# Patient Record
Sex: MALE | URBAN - NONMETROPOLITAN AREA
[De-identification: names, ages, dates, MRNs, and addresses within clinical notes are randomized per-mention and may not be internally consistent; named-entity substitution may affect disease eponyms.]

---

## 2018-06-20 ENCOUNTER — APPOINTMENT (RX ONLY)
Dept: URBAN - NONMETROPOLITAN AREA CLINIC 22 | Facility: CLINIC | Age: 33
Setting detail: DERMATOLOGY
End: 2018-06-20

## 2018-06-20 DIAGNOSIS — L40.0 PSORIASIS VULGARIS: ICD-10-CM

## 2018-06-20 DIAGNOSIS — B07.0 PLANTAR WART: ICD-10-CM

## 2018-06-20 PROCEDURE — ? PRESCRIPTION

## 2018-06-20 PROCEDURE — 99202 OFFICE O/P NEW SF 15 MIN: CPT | Mod: 25

## 2018-06-20 PROCEDURE — ? LIQUID NITROGEN

## 2018-06-20 PROCEDURE — ? TREATMENT REGIMEN

## 2018-06-20 PROCEDURE — 17110 DESTRUCTION B9 LES UP TO 14: CPT

## 2018-06-20 PROCEDURE — ? COUNSELING

## 2018-06-20 RX ORDER — CALCIPOTRIENE 50 UG/G
0.005% CREAM TOPICAL DAILY
Qty: 1 | Refills: 11 | Status: ERX | COMMUNITY
Start: 2018-06-20

## 2018-06-20 RX ORDER — TRIAMCINOLONE ACETONIDE 5 MG/G
0.5% CREAM TOPICAL
Qty: 2 | Refills: 4 | Status: ERX | COMMUNITY
Start: 2018-06-20

## 2018-06-20 RX ADMIN — TRIAMCINOLONE ACETONIDE 0.5%: 5 CREAM TOPICAL at 16:48

## 2018-06-20 RX ADMIN — CALCIPOTRIENE 0.005%: 50 CREAM TOPICAL at 14:45

## 2018-06-20 ASSESSMENT — LOCATION DETAILED DESCRIPTION DERM
LOCATION DETAILED: RIGHT ANTERIOR SCROTUM
LOCATION DETAILED: RIGHT MEDIAL 5TH TOE

## 2018-06-20 ASSESSMENT — LOCATION SIMPLE DESCRIPTION DERM
LOCATION SIMPLE: RIGHT 5TH TOE
LOCATION SIMPLE: SCROTUM

## 2018-06-20 ASSESSMENT — LOCATION ZONE DERM
LOCATION ZONE: TOE
LOCATION ZONE: GENITALIA

## 2018-06-20 ASSESSMENT — TOTAL NUMBER OF LESIONS: # OF LESIONS?: 6

## 2018-06-20 NOTE — PROCEDURE: TREATMENT REGIMEN
Plan: Discussed diagnosis in detail with patient today. Discussed with patient that this condition is a chronic condition in which symptoms can be managed but, may never clear completely. Informed patient that symptoms of this condition can be exacerbated with heat and weather changes. Informed patient of the risks and side effects of topical steroids. Informed patient that continuous steroid use will cause thinning of the skin and could worsen his symptoms. Informed patient that steroids are best used in short intervals. All of the patient's questions were answered to his satisfaction. Informed patient to begin treatment plan as discussed today, patient voiced understanding
Action 2: Continue
Detail Level: Zone
Plan: Informed patient that warts can be treated with cryotherapy today. Informed patient of the side effects associated with treatment with cryotherapy. Patient voiced understanding and is agreeable to treatment with cryotherapy as discussed today. Informed patient that he may follow up at this convenience for further treatment of other lesions
Initiate Regimen: - calcipotriene 0.005 % topical cream : Apply to psoriasis 1-2 times daily. Avoid use on the face, groin, & armpits\\n- Triamcinolone Acetonide 0.05% - Apply to affected areas on the groin BID, 5 days on 5 days Off PRN

## 2018-06-20 NOTE — PROCEDURE: LIQUID NITROGEN
Medical Necessity Clause: This procedure was medically necessary because the lesions that were treated were:
Number Of Freeze-Thaw Cycles: 2 freeze-thaw cycles
Duration Of Freeze Thaw-Cycle (Seconds): 5-10
Detail Level: Detailed
Post-Care Instructions: I reviewed with the patient in detail post-care instructions. Patient is to wear sunprotection, and avoid picking at any of the treated lesions. Pt may apply Vaseline to crusted or scabbing areas.
Medical Necessity Information: It is in your best interest to select a reason for this procedure from the list below. All of these items fulfill various CMS LCD requirements except the new and changing color options.
Consent: The patient's consent was obtained including but not limited to risks of crusting, scabbing, blistering, scarring, darker or lighter pigmentary change, recurrence, incomplete removal and infection.
Add 52 Modifier (Optional): no
Render Post-Care Instructions In Note?: yes

## 2018-06-20 NOTE — HPI: WARTS (VERRUCA)
Is This A New Presentation, Or A Follow-Up?: Warts
How Severe Are Your Warts?: mild
Treatment Number (Optional): 1
Additional History: Patient states that his warts were treated with Cryotherapy over 1 year ago. He states that he was seen by a podiatrist 2 months ago that shaved his warts down. Patient states that he is leaving on a trip and would like to have is most bothersome lesion treated today.

## 2018-07-12 ENCOUNTER — APPOINTMENT (RX ONLY)
Dept: URBAN - NONMETROPOLITAN AREA CLINIC 22 | Facility: CLINIC | Age: 33
Setting detail: DERMATOLOGY
End: 2018-07-12

## 2018-07-12 DIAGNOSIS — L40.0 PSORIASIS VULGARIS: ICD-10-CM

## 2018-07-12 DIAGNOSIS — B07.0 PLANTAR WART: ICD-10-CM

## 2018-07-12 PROBLEM — L20.84 INTRINSIC (ALLERGIC) ECZEMA: Status: ACTIVE | Noted: 2018-07-12

## 2018-07-12 PROCEDURE — ? COUNSELING

## 2018-07-12 PROCEDURE — ? TREATMENT REGIMEN

## 2018-07-12 PROCEDURE — ? LIQUID NITROGEN

## 2018-07-12 PROCEDURE — 99213 OFFICE O/P EST LOW 20 MIN: CPT | Mod: 25

## 2018-07-12 PROCEDURE — 17110 DESTRUCTION B9 LES UP TO 14: CPT

## 2018-07-12 ASSESSMENT — LOCATION ZONE DERM
LOCATION ZONE: GENITALIA
LOCATION ZONE: TOE

## 2018-07-12 ASSESSMENT — LOCATION SIMPLE DESCRIPTION DERM
LOCATION SIMPLE: SCROTUM
LOCATION SIMPLE: RIGHT FOOT

## 2018-07-12 ASSESSMENT — LOCATION DETAILED DESCRIPTION DERM
LOCATION DETAILED: RIGHT ANTERIOR SCROTUM
LOCATION DETAILED: RIGHT 4TH TOE

## 2018-07-12 NOTE — PROCEDURE: LIQUID NITROGEN
Post-Care Instructions: I reviewed with the patient in detail post-care instructions. Patient is to wear sunprotection, and avoid picking at any of the treated lesions. Pt may apply Vaseline to crusted or scabbing areas.
Medical Necessity Information: It is in your best interest to select a reason for this procedure from the list below. All of these items fulfill various CMS LCD requirements except the new and changing color options.
Include Z78.9 (Other Specified Conditions Influencing Health Status) As An Associated Diagnosis?: Yes
Add 52 Modifier (Optional): no
Number Of Freeze-Thaw Cycles: 1 freeze-thaw cycle
Detail Level: Detailed
Medical Necessity Clause: This procedure was medically necessary because the lesions that were treated were:
Consent: The patient's consent was obtained including but not limited to risks of crusting, scabbing, blistering, scarring, darker or lighter pigmentary change, recurrence, incomplete removal and infection.
Duration Of Freeze Thaw-Cycle (Seconds): 5-10

## 2018-07-12 NOTE — PROCEDURE: TREATMENT REGIMEN
Action 3: Continue
Detail Level: Zone
Plan: Discussed with patient that this condition is a chronic condition in which symptoms can be managed but, may never clear completely. Informed patient that symptoms of this condition can be exacerbated with heat and weather changes. Informed patient of the risks and side effects of topical steroids. Informed patient that continuous steroid use will cause thinning of the skin and could worsen his symptoms. Informed patient that steroids are best used in short intervals. All of the patient's questions were answered to his satisfaction. Informed patient to continue treatment plan as discussed today, patient voiced understanding
Continue Regimen: - calcipotriene 0.005 % topical cream : Apply to psoriasis 1-2 times daily.\\n- Triamcinolone Acetonide 0.05% - Apply to affected areas Avoid use on the face, groin, & armpits  BID, 5 days on 5 days Off PRN

## 2018-07-12 NOTE — PROCEDURE: COUNSELING
Patient Specific Counseling (Will Not Stick From Patient To Patient): Patient states that he is unable to have all locations treated today and will schedule back at his convenience for removal of additional locations on his feet.
Detail Level: Simple
Detail Level: Generalized

## 2018-07-23 ENCOUNTER — APPOINTMENT (RX ONLY)
Dept: URBAN - NONMETROPOLITAN AREA CLINIC 22 | Facility: CLINIC | Age: 33
Setting detail: DERMATOLOGY
End: 2018-07-23

## 2018-07-23 DIAGNOSIS — B07.0 PLANTAR WART: ICD-10-CM

## 2018-07-23 PROCEDURE — ? BENIGN DESTRUCTION

## 2018-07-23 PROCEDURE — ? COUNSELING

## 2018-07-23 PROCEDURE — 17110 DESTRUCTION B9 LES UP TO 14: CPT

## 2018-07-23 ASSESSMENT — LOCATION ZONE DERM
LOCATION ZONE: TOE
LOCATION ZONE: FEET

## 2018-07-23 ASSESSMENT — LOCATION DETAILED DESCRIPTION DERM
LOCATION DETAILED: RIGHT PLANTAR FOREFOOT OVERLYING 4TH METATARSAL
LOCATION DETAILED: RIGHT PLANTAR FOREFOOT OVERLYING 2ND METATARSAL
LOCATION DETAILED: RIGHT LATERAL PLANTAR MIDFOOT
LOCATION DETAILED: RIGHT 4TH TOE
LOCATION DETAILED: RIGHT MEDIAL PLANTAR MIDFOOT

## 2018-07-23 ASSESSMENT — LOCATION SIMPLE DESCRIPTION DERM
LOCATION SIMPLE: RIGHT PLANTAR SURFACE
LOCATION SIMPLE: RIGHT FOOT

## 2018-07-23 NOTE — PROCEDURE: BENIGN DESTRUCTION
Anesthesia Volume In Cc: 1
Post-Care Instructions: I reviewed with the patient in detail post-care instructions. Patient is to wear sunprotection, and avoid picking at any of the treated lesions.
Render Post-Care Instructions In Note?: yes
Detail Level: Detailed
Medical Necessity Information: It is in your best interest to select a reason for this procedure from the list below. All of these items fulfill various CMS LCD requirements except the new and changing color options.
Consent: The patient's consent was obtained including but not limited to risks of crusting, scabbing, blistering, scarring, darker or lighter pigmentary change, recurrence, incomplete removal and infection.
Include Z78.9 (Other Specified Conditions Influencing Health Status) As An Associated Diagnosis?: No
Medical Necessity Clause: This procedure was medically necessary because the lesions that were treated were:
Treatment Number (Will Not Render If 0): 0